# Patient Record
Sex: FEMALE | Race: WHITE
[De-identification: names, ages, dates, MRNs, and addresses within clinical notes are randomized per-mention and may not be internally consistent; named-entity substitution may affect disease eponyms.]

---

## 2017-03-04 ENCOUNTER — HOSPITAL ENCOUNTER (INPATIENT)
Dept: HOSPITAL 62 - ER | Age: 67
LOS: 2 days | Discharge: HOME | DRG: 390 | End: 2017-03-06
Attending: SURGERY | Admitting: SURGERY
Payer: MEDICARE

## 2017-03-04 DIAGNOSIS — F32.9: ICD-10-CM

## 2017-03-04 DIAGNOSIS — E03.9: ICD-10-CM

## 2017-03-04 DIAGNOSIS — K56.5: Primary | ICD-10-CM

## 2017-03-04 DIAGNOSIS — Z90.49: ICD-10-CM

## 2017-03-04 DIAGNOSIS — Z88.6: ICD-10-CM

## 2017-03-04 LAB
ALBUMIN SERPL-MCNC: 4.7 G/DL (ref 3.5–5)
ALP SERPL-CCNC: 93 U/L (ref 38–126)
ALT SERPL-CCNC: 34 U/L (ref 9–52)
ANION GAP SERPL CALC-SCNC: 14 MMOL/L (ref 5–19)
APPEARANCE UR: CLEAR
AST SERPL-CCNC: 27 U/L (ref 14–36)
BASOPHILS # BLD AUTO: 0.1 10^3/UL (ref 0–0.2)
BASOPHILS NFR BLD AUTO: 0.7 % (ref 0–2)
BILIRUB DIRECT SERPL-MCNC: 0 MG/DL (ref 0–0.3)
BILIRUB SERPL-MCNC: 0.9 MG/DL (ref 0.2–1.3)
BILIRUB UR QL STRIP: NEGATIVE
BUN SERPL-MCNC: 12 MG/DL (ref 7–20)
CALCIUM: 10.7 MG/DL (ref 8.4–10.2)
CHLORIDE SERPL-SCNC: 107 MMOL/L (ref 98–107)
CK SERPL-CCNC: 60 U/L (ref 30–135)
CO2 SERPL-SCNC: 23 MMOL/L (ref 22–30)
CREAT SERPL-MCNC: 0.61 MG/DL (ref 0.52–1.25)
EOSINOPHIL # BLD AUTO: 0.1 10^3/UL (ref 0–0.6)
EOSINOPHIL NFR BLD AUTO: 0.8 % (ref 0–6)
ERYTHROCYTE [DISTWIDTH] IN BLOOD BY AUTOMATED COUNT: 13.7 % (ref 11.5–14)
GLUCOSE SERPL-MCNC: 115 MG/DL (ref 75–110)
GLUCOSE UR STRIP-MCNC: NEGATIVE MG/DL
HCT VFR BLD CALC: 38.3 % (ref 36–47)
HGB BLD-MCNC: 12.5 G/DL (ref 12–15.5)
HGB HCT DIFFERENCE: -0.8
KETONES UR STRIP-MCNC: 20 MG/DL
LIPASE SERPL-CCNC: 67.8 U/L (ref 23–300)
LYMPHOCYTES # BLD AUTO: 0.8 10^3/UL (ref 0.5–4.7)
LYMPHOCYTES NFR BLD AUTO: 8 % (ref 13–45)
MCH RBC QN AUTO: 27.4 PG (ref 27–33.4)
MCHC RBC AUTO-ENTMCNC: 32.7 G/DL (ref 32–36)
MCV RBC AUTO: 84 FL (ref 80–97)
MONOCYTES # BLD AUTO: 0.3 10^3/UL (ref 0.1–1.4)
MONOCYTES NFR BLD AUTO: 3.1 % (ref 3–13)
NEUTROPHILS # BLD AUTO: 9.2 10^3/UL (ref 1.7–8.2)
NEUTS SEG NFR BLD AUTO: 87.4 % (ref 42–78)
NITRITE UR QL STRIP: NEGATIVE
PH UR STRIP: 7 [PH] (ref 5–9)
POTASSIUM SERPL-SCNC: 4.2 MMOL/L (ref 3.6–5)
PROT SERPL-MCNC: 7.5 G/DL (ref 6.3–8.2)
PROT UR STRIP-MCNC: NEGATIVE MG/DL
RBC # BLD AUTO: 4.58 10^6/UL (ref 3.72–5.28)
SODIUM SERPL-SCNC: 144.3 MMOL/L (ref 137–145)
SP GR UR STRIP: 1.01
UROBILINOGEN UR-MCNC: NEGATIVE MG/DL (ref ?–2)
WBC # BLD AUTO: 10.5 10^3/UL (ref 4–10.5)

## 2017-03-04 PROCEDURE — 82550 ASSAY OF CK (CPK): CPT

## 2017-03-04 PROCEDURE — 96372 THER/PROPH/DIAG INJ SC/IM: CPT

## 2017-03-04 PROCEDURE — 85025 COMPLETE CBC W/AUTO DIFF WBC: CPT

## 2017-03-04 PROCEDURE — 71010: CPT

## 2017-03-04 PROCEDURE — 74000: CPT

## 2017-03-04 PROCEDURE — 96374 THER/PROPH/DIAG INJ IV PUSH: CPT

## 2017-03-04 PROCEDURE — 93005 ELECTROCARDIOGRAM TRACING: CPT

## 2017-03-04 PROCEDURE — 81001 URINALYSIS AUTO W/SCOPE: CPT

## 2017-03-04 PROCEDURE — 93010 ELECTROCARDIOGRAM REPORT: CPT

## 2017-03-04 PROCEDURE — S0164 INJECTION PANTROPRAZOLE: HCPCS

## 2017-03-04 PROCEDURE — 36415 COLL VENOUS BLD VENIPUNCTURE: CPT

## 2017-03-04 PROCEDURE — 83605 ASSAY OF LACTIC ACID: CPT

## 2017-03-04 PROCEDURE — 0D9670Z DRAINAGE OF STOMACH WITH DRAINAGE DEVICE, VIA NATURAL OR ARTIFICIAL OPENING: ICD-10-PCS | Performed by: SURGERY

## 2017-03-04 PROCEDURE — S0028 INJECTION, FAMOTIDINE, 20 MG: HCPCS

## 2017-03-04 PROCEDURE — 80048 BASIC METABOLIC PNL TOTAL CA: CPT

## 2017-03-04 PROCEDURE — 83690 ASSAY OF LIPASE: CPT

## 2017-03-04 PROCEDURE — 80076 HEPATIC FUNCTION PANEL: CPT

## 2017-03-04 PROCEDURE — 99285 EMERGENCY DEPT VISIT HI MDM: CPT

## 2017-03-04 PROCEDURE — 74177 CT ABD & PELVIS W/CONTRAST: CPT

## 2017-03-04 PROCEDURE — 84484 ASSAY OF TROPONIN QUANT: CPT

## 2017-03-04 PROCEDURE — 96375 TX/PRO/DX INJ NEW DRUG ADDON: CPT

## 2017-03-04 PROCEDURE — 94799 UNLISTED PULMONARY SVC/PX: CPT

## 2017-03-04 PROCEDURE — 74020: CPT

## 2017-03-04 RX ADMIN — LATANOPROST SCH DROP: 50 SOLUTION OPHTHALMIC at 23:51

## 2017-03-04 RX ADMIN — DEXAMETHASONE SODIUM PHOSPHATE PRN MG: 10 INJECTION INTRAMUSCULAR; INTRAVENOUS at 20:17

## 2017-03-04 RX ADMIN — MORPHINE SULFATE PRN MG: 10 INJECTION INTRAMUSCULAR; INTRAVENOUS; SUBCUTANEOUS at 21:57

## 2017-03-04 RX ADMIN — PANTOPRAZOLE SODIUM SCH MG: 40 INJECTION, POWDER, FOR SOLUTION INTRAVENOUS at 21:59

## 2017-03-04 RX ADMIN — DEXTROSE, SODIUM CHLORIDE, AND POTASSIUM CHLORIDE PRN ML: 5; .45; .15 INJECTION INTRAVENOUS at 20:08

## 2017-03-04 RX ADMIN — MORPHINE SULFATE PRN MG: 10 INJECTION INTRAMUSCULAR; INTRAVENOUS; SUBCUTANEOUS at 23:52

## 2017-03-04 RX ADMIN — MORPHINE SULFATE PRN MG: 10 INJECTION INTRAMUSCULAR; INTRAVENOUS; SUBCUTANEOUS at 23:51

## 2017-03-04 NOTE — ER DOCUMENT REPORT
ED GI/





- General


Chief Complaint: Abdominal Pain >50


Stated Complaint: ABDOMINAL PAIN


Time seen by provider: 11:05


Notes: 


The patient is a 66-year-old female, past medical history 2 prior bowel 

obstructions, cholecystectomy, brain aneurysm repair, presents with 2 days of 

diffuse abdominal pain, worse in the epigastric area and left lower quadrant 

area, nausea and multiple episodes of non-bilious, non-feculent vomiting.  Her 

last bowel movement was 2 days ago.  She is now having muscle cramps because 

she cannot drink anything.  She denies fevers, hematemesis, diarrhea, urinary 

symptoms, chest pain, shortness of breath or flank pain.


TRAVEL OUTSIDE OF THE [place] IN LAST 30 DAYS: No





- Related Data


Allergies/Adverse Reactions: 


 





codeine [Codeine] Allergy (Verified 02/25/14 18:36)


 Nausea











Past Medical History





- General


Information source: Patient





- Social History


Smoking Status: Unknown if Ever Smoked


Family History: Reviewed & Not Pertinent


Pulmonary Medical History: 


   Denies: Hx Tuberculosis


Endocrine Medical History: Reports: Hx Hypothyroidism


Psychiatric Medical History: 


   Denies: Hx Depression


Past Surgical History: Reports: Hx Cholecystectomy.  Denies: Hx Pacemaker





- Immunizations


Hx Diphtheria, Pertussis, Tetanus Vaccination: No





Review of Systems





- Review of Systems


Notes: 


REVIEW OF SYSTEMS:


CONSTITUTIONAL: -fevers, -chills


EENT: -eye pain, -difficulty swallowing, -nasal congestion


CARDIOVASCULAR:-chest pain, -syncope.


RESPIRATORY: -cough, -SOB


GASTROINTESTINAL: +abdominal pain, +nausea, +vomiting, -diarrhea


GENITOURINARY: -dysuria, -hematuria


MUSCULOSKELETAL: -back pain, -neck pain


SKIN: -rash or skin lesions.


HEMATOLOGIC: -easy bruising or bleeding.


LYMPHATIC: -swollen, enlarged glands.


NEUROLOGICAL: -altered mental status or loss of consciousness, -headache, -

neurologic symptoms


PSYCHIATRIC: -anxiety, -depression.


ALL OTHER SYSTEMS REVIEWED AND NEGATIVE.





Physical Exam





- Vital signs


Vitals: 


 











Resp Pulse Ox


 


 18   100 


 


 03/04/17 12:02  03/04/17 12:02














- Notes


Notes: 


PHYSICAL EXAMINATION:





GENERAL: Well-nourished and in moderate acute distress.





HEAD: Atraumatic, normocephalic.





EYES: Pupils equal round and reactive to light, extraocular movements intact, 

sclera anicteric, conjunctiva are normal.





ENT: nares patent, oropharynx clear without exudates.  Moist mucous membranes.





NECK: Normal range of motion, supple without lymphadenopathy





LUNGS: Breath sounds clear to auscultation bilaterally and equal.  No wheezes 

rales or rhonchi.





HEART: Regular rate and rhythm without murmurs





ABDOMEN: Mildly distended, tenderness over epigastric region and LLL. Decreased 

bowel sounds.





EXTREMITIES: Normal range of motion, no pitting or edema.  No cyanosis.





NEUROLOGICAL: Cranial nerves grossly intact.  Normal speech, normal gait.  

Normal sensory, motor, and reflex exams.





PSYCH: Normal mood, normal affect.





SKIN: Warm, Dry, normal turgor, no rashes or lesions noted.





Course





- Re-evaluation


Re-evalutation: 


With history of multiple small bowel suction, will obtain labs and CT A/P to 

assess for obstruction.  Will also treat pain and nausea and reassess.





03/04/17 14:21 Pt with partial small bowel structure and transition in pelvis.  

Spoke to Dr. Damico (Surgicalist) and he has accepted patient.  Will place NG 

tube and admit.





- Vital Signs


Vital signs: 


 











Temp Pulse Resp BP Pulse Ox


 


       14   162/64 H  96 


 


       03/04/17 14:03  03/04/17 14:03  03/04/17 14:03














- Laboratory


Result Diagrams: 


 03/04/17 11:18





 03/04/17 11:18


Laboratory results interpreted by me: 


 











  03/04/17 03/04/17 03/04/17





  11:18 11:18 13:12


 


Seg Neutrophils %  87.4 H  


 


Lymphocytes %  8.0 L  


 


Absolute Neutrophils  9.2 H  


 


Glucose   115 H 


 


Calcium   10.7 H 


 


Urine Ketones    20 H














Discharge





- Discharge


Clinical Impression: 


 Partial small bowel obstruction





Condition: Stable


Disposition: ADMITTED AS INPATIENT


Admitting Provider: Surgicalist - Margaux


Unit Admitted: Surgical Floor

## 2017-03-04 NOTE — EKG REPORT
SEVERITY:- ABNORMAL ECG -

SINUS RHYTHM

CONSIDER LEFT VENTRICULAR HYPERTROPHY

BORDERLINE PROLONGED QT INTERVAL

:

Confirmed by: Vaibhav Camejo MD 04-Mar-2017 19:42:57

## 2017-03-04 NOTE — CONSULT/HISTORY AND PHYSICAL E
Consultation/History and Physical



PATIENT NAME: DARIA CARREON

MRN:  [id number]      : 1950     AGE: 66Y

DATE: 2017                         ROOM: 413



CHIEF COMPLAINT:

Abdominal pain.



HISTORY OF PRESENT ILLNESS:

The patient is a 66-year-old woman who has had, over the past 5 years, 3

episodes of partial small bowel obstruction.  Treated successfully with

nasogastric tube suction.  Over the last 3 days, she has had intermittent

abdominal pain and then yesterday developed vomiting and nausea and

presented to the emergency department where CT scan is consistent with a

partial versus complete small bowel obstruction.  She now states she feels

better after nasogastric tube was placed by the emergency room personnel. 

The patient states that she is still having bowel movements and had a

normal bowel movement yesterday.  She denies fever or chills or vomiting

up blood.  She denies chest pain, shortness of breath, or flank pain.



PAST MEDICAL HISTORY:

Significant for:

1.  Cerebral aneurysm treated successfully with percutaneous coil

placement.

2.  She has a history of hypothyroidism for which she takes Synthroid.

3.  She has a history of depression.



PAST SURGICAL HISTORY:

Positive for cholecystectomy.



SOCIAL HISTORY:

The patient presents with her family and does not smoke.



FAMILY HISTORY:

Otherwise noncontributory.



REVIEW OF SYSTEMS:

The patient states that she has been in very good health.  She

occasionally will have headaches.  She uses eyedrops for mild discomfort. 

She has no other complaint of head or neck problems.  She denies chest

pain, shortness of breath, change in bowel or bladder habits other than

that noted in the HPI.  She has no complaint of her skin or

musculoskeletal system, and other review of systems is otherwise negative.



ALLERGIES:

When questioned, the patient states she has an allergy to CODEINE, but her

allergy is more an intolerance because she stated it caused some nausea,

but she denies any difficulty breathing or skin changes.  I think this is

a common problem with codeine, and I doubt this is a severe allergy and

she agrees.



PHYSICAL EXAMINATION:

GENERAL:  She presents as a pleasant woman, in no acute distress. 

Nasogastric tube is in place with a small amount of output of what appears

to be contrast material.



HEAD AND NECK:  Examination grossly unremarkable for age.



CHEST:  Clear to auscultation and percussion.



CARDIAC:  Exam is without murmur, gallop, or rub.



ABDOMEN:  The abdomen is soft.  There is no significant tenderness.  There

is protuberance.  There is no evidence of peritonitis.  There is a

well-healed paramedian scar in the upper abdomen status post

cholecystectomy.  There are no other abdominal scars.



EXTREMITIES:  Peripheral examination is unremarkable regarding deep tendon

reflexes and pulses.



DIAGNOSTIC DATA:

The chest x-ray is within the normal range.  Lab base includes a white

count of 10,500, hemoglobin and hematocrit are 12.5 and 38.5, differential

is without bandemia.  Sodium 144, potassium 4.2, chloride 107, CO2 is 23,

BUN/creatinine are 12 and 0.61, glucose slightly elevated at 115.  The

total bilirubin was 0.9, ALT/AST within the normal range, lipase is within

the normal range.  Urinalysis without evidence of infection.  CT scan is

reviewed and is consistent with dilated loops of small bowel with an

apparent transition area in the pelvis.



IMPRESSION:

A 66-year-old woman with partial small bowel obstruction and a history of

recurrent small bowel obstructions, status post surgical intervention for

a cholecystectomy through a paramedian incision with no other history of

surgical abdominal interventions.



RECOMMENDATIONS:

The plan at this time is for a nasogastric tube suction, and it appears

that the patient vomited the contrast for her CT scan, and in an hour or

so, we will place water soluble contrast through her NG tube in hopes of

being able to get a KUB followup in the a.m. to verify a bowel obstructive

process or to hopefully have things open up with evidence of contrast in

her colon and decreased small bowel dilatation.  The patient and her

 understand the recommended course, and they understand that if we

documented a small bowel obstruction, then surgical intervention will be

necessary and further recommendations will follow.





DICTATING PHYSICIAN: ALBINA GASTON M.D.





[id number]                  DT: 2017    1811

PHY#: [id number]            DD: 2017    1704

ID:   [id number]           JOB#: [id number]       ACCT: [id number]



cc:SAGAR GASTON M.D.

>

## 2017-03-05 RX ADMIN — DEXTROSE, SODIUM CHLORIDE, AND POTASSIUM CHLORIDE PRN ML: 5; .45; .15 INJECTION INTRAVENOUS at 04:21

## 2017-03-05 RX ADMIN — LATANOPROST SCH DROP: 50 SOLUTION OPHTHALMIC at 21:26

## 2017-03-05 RX ADMIN — DEXAMETHASONE SODIUM PHOSPHATE PRN MG: 10 INJECTION INTRAMUSCULAR; INTRAVENOUS at 07:43

## 2017-03-05 RX ADMIN — LEVOTHYROXINE SODIUM SCH MG: 88 TABLET ORAL at 10:25

## 2017-03-05 RX ADMIN — PANTOPRAZOLE SODIUM SCH MG: 40 INJECTION, POWDER, FOR SOLUTION INTRAVENOUS at 10:25

## 2017-03-05 RX ADMIN — MORPHINE SULFATE PRN MG: 10 INJECTION INTRAMUSCULAR; INTRAVENOUS; SUBCUTANEOUS at 12:44

## 2017-03-05 RX ADMIN — DEXAMETHASONE SODIUM PHOSPHATE PRN MG: 10 INJECTION INTRAMUSCULAR; INTRAVENOUS at 00:08

## 2017-03-05 RX ADMIN — DEXAMETHASONE SODIUM PHOSPHATE PRN MG: 10 INJECTION INTRAMUSCULAR; INTRAVENOUS at 12:25

## 2017-03-05 RX ADMIN — MORPHINE SULFATE PRN MG: 10 INJECTION INTRAMUSCULAR; INTRAVENOUS; SUBCUTANEOUS at 02:28

## 2017-03-05 RX ADMIN — ENOXAPARIN SODIUM SCH MG: 40 INJECTION SUBCUTANEOUS at 07:42

## 2017-03-05 RX ADMIN — PANTOPRAZOLE SODIUM SCH MG: 40 INJECTION, POWDER, FOR SOLUTION INTRAVENOUS at 21:26

## 2017-03-05 RX ADMIN — DEXTROSE, SODIUM CHLORIDE, AND POTASSIUM CHLORIDE PRN ML: 5; .45; .15 INJECTION INTRAVENOUS at 15:28

## 2017-03-05 NOTE — PROGRESS NOTE E
Progress Note



NAME: DARIA CARREON

MRN: [id number]

: 1950             AGE: 66Y

DATE: 2017            ROOM: 413



SUBJECTIVE:

The patient is feeling better this morning.  She is afebrile with stable

vital signs.  She had no further complaints of abdominal pain, and she

states her nausea has resolved.  She also states she is having bowel

movements.



OBJECTIVE:

GENERAL:  On physical examination, she is alert and appropriate.



VITAL SIGNS:  Again, she is afebrile.  Blood pressure is 143/60.



HEAD AND NECK:  Examination remains unchanged.



CHEST:  Clear to auscultation and percussion.



CARDIOVASCULAR:  Without murmur, gallop, or rub.



ABDOMEN:  Her abdomen is soft, less protuberant.  There is no significant

tenderness.  There is no evidence of peritonitis.



EXTREMITIES:  Peripheral examination is unremarkable.



DIAGNOSTIC DATA:

Her KUB with contrast placement per NG tube reveals no evidence of

obstructive process with contrast reaching the rectum.  There is one small

area of dilated small bowel in the right upper abdomen.  However, again,

with contrast to the rectum and no evidence of further significant

abdominal distention.



PLAN:

Plan will be for discontinuation of her nasogastric tube and initiation of

a clear-liquid diet and observation.  She was encouraged to be up,

ambulating and out of bed with a plan to advance her diet as tolerated.





DICTATING PHYSICIAN:  ALBINA GASTON M.D.





[id number]                  DT: 2017    1116

PHY#: [id number]            DD: 2017    1035

ID:   [id number]           JOB#: [id number]       ACCT: [id number]



cc:

>

## 2017-03-06 VITALS — DIASTOLIC BLOOD PRESSURE: 61 MMHG | SYSTOLIC BLOOD PRESSURE: 154 MMHG

## 2017-03-06 RX ADMIN — DEXTROSE, SODIUM CHLORIDE, AND POTASSIUM CHLORIDE PRN ML: 5; .45; .15 INJECTION INTRAVENOUS at 03:30

## 2017-03-06 RX ADMIN — DEXAMETHASONE SODIUM PHOSPHATE PRN MG: 10 INJECTION INTRAMUSCULAR; INTRAVENOUS at 03:30

## 2017-03-06 RX ADMIN — LEVOTHYROXINE SODIUM SCH MG: 88 TABLET ORAL at 09:15

## 2017-03-06 RX ADMIN — PANTOPRAZOLE SODIUM SCH MG: 40 INJECTION, POWDER, FOR SOLUTION INTRAVENOUS at 09:15

## 2017-03-06 RX ADMIN — ENOXAPARIN SODIUM SCH MG: 40 INJECTION SUBCUTANEOUS at 08:32

## 2017-03-06 RX ADMIN — MORPHINE SULFATE PRN MG: 10 INJECTION INTRAMUSCULAR; INTRAVENOUS; SUBCUTANEOUS at 03:30

## 2017-03-06 RX ADMIN — DEXAMETHASONE SODIUM PHOSPHATE PRN MG: 10 INJECTION INTRAMUSCULAR; INTRAVENOUS at 09:08

## 2017-03-06 NOTE — DISCHARGE SUMMARY E
Discharge Summary



NAME: DARIA CARREON

MRN:  [id number]        : 1950     AGE: 66Y

ADMITTED: 2017                  DISCHARGED: 2017



REASON FOR ADMISSION:

Small bowel obstruction.



SUMMARY OF HOSPITALIZATION:

The patient is a 66-year-old white female with a long history of recurrent

partial small bowel obstructions felt secondary to adhesions.  She has had

over 4 in the past 10 years.  She is evaluated in the emergency department

because of abdominal pain, nausea, and vomiting.  She was found by CT scan

of the abdomen and pelvis to have multiple loops of dilated small bowel

consistent with partial small bowel obstruction versus ileus.  Surgery was

consulted and she was advised admission to the hospital for further

management.



The patient was kept on IV fluids, n.p.o., and had a nasogastric tube

inserted.  Patient had approximately 1-1/2 L of drainage out the first

day.  The following day she was feeling better and had a followup

abdominal scan which showed migration of oral contrast through the GI

tract into the colon.  Nasogastric tube was removed and the following day

she had a followup film which showed contrast in the colon.  She was

started on a clear liquid diet and this was advanced to full liquids and

tolerated reasonably well.  She had evidence of stool and flatus.  She had

no peritoneal signs.  She did have a minimally dilated small bowel loop

remaining, however, not particularly concerning.



Management options were discussed with the patient.  She expressed

interest in going home, so that was arranged.



FINAL DIAGNOSIS:

Partial small bowel obstruction, recurrent, now resolved with nonoperative

management.



DISPOSITION:

Patient will be discharged home in care of family.  Follow up with Dr. Prasad or [facility] team member on a p.r.n. basis.







DICTATING PHYSICIAN:  WILY PRASAD M.D.





[id number]                  DT: 2017    1519

PHY#: [id number]            DD: 2017    1426

ID:   [id number]           JOB#: [id number]       ACCT: [id number]



cc:SAGAR GASTON M.D., TIMOTHY M.D.

>

## 2017-03-06 NOTE — PDOC PROGRESS REPORT
Subjective


Progress Note for:: 03/06/17


Subjective:: 


Patient states she has had a bowel movement, and is relieved of nausea which 

she did have earlier this morning.





Physical Exam


Vital Signs: 


 











Temp Pulse Resp BP Pulse Ox


 


 97.6 F   72   16   134/57 H  100 


 


 03/06/17 07:27  03/06/17 07:27  03/06/17 07:27  03/06/17 07:27  03/06/17 07:27








 Intake & Output











 03/05/17 03/06/17 03/07/17





 06:59 06:59 06:59


 


Intake Total 0 3560 


 


Output Total 1600 2200 


 


Balance -1600 1360 


 


Weight 67.5 kg 66 kg 











General appearance: PRESENT: no acute distress


GI/Abdominal exam: PRESENT: other - No peritoneal signs; abdomen slightly 

distended





Results


Laboratory Results: 


 











  03/05/17





  09:50


 


Lactic Acid  0.9











Impressions: 


 





Abdomen/Pelvis CT  03/04/17 00:00


IMPRESSION:  Ileus or partial small bowel obstruction with transition in the 

pelvic region.


 








Chest X-Ray  03/04/17 11:04


IMPRESSION:  NO ACUTE RADIOGRAPHIC FINDING IN THE CHEST.


 








KUB X-Ray  03/04/17 16:10


IMPRESSION:  Nasogastric tube tip and side port in the stomach.


No small bowel air-fluid levels.


There is air in nondistended colon.


 








Abdomen X-Ray  03/05/17 08:09


IMPRESSION:  Nonobstructive bowel gas pattern with oral contrast last night CT 

throughout the colon.  Single persistent borderline dilated right upper 

quadrant small bowel loop


 














Assessment & Plan





- Diagnosis


(1) Partial small bowel obstruction


Is this a current diagnosis for this admission?: YesPlan: 


1.  Gastrointestinal tract appears to be opening up his on radiographic and 

clinical progress.





2.  Will advance to full liquid diet, and potentially send patient home this 

afternoon.

## 2018-06-04 ENCOUNTER — HOSPITAL ENCOUNTER (OUTPATIENT)
Dept: HOSPITAL 62 - SP | Age: 68
End: 2018-06-04
Attending: PHYSICAL MEDICINE & REHABILITATION
Payer: MEDICARE

## 2018-06-04 DIAGNOSIS — R06.02: ICD-10-CM

## 2018-06-04 DIAGNOSIS — R60.0: Primary | ICD-10-CM

## 2018-06-04 PROCEDURE — 93970 EXTREMITY STUDY: CPT

## 2018-06-04 NOTE — XCELERA REPORT
[address] [address]

                             Tel: [phone]

                             Fax: [phone]



                     Lower Extremity Venous Evaluation

____________________________________________________________________________



Name: DARIA CARREON

MRN: [id number]                Age: 67 yrs

Gender: Female                 : 1950

Patient Status: Outpatient     Patient Location: 

Account #: [id number]

Study Date: 2018 01:28 PM

Accession #: [id number]

____________________________________________________________________________



Procedure: Color flow and duplex imaging bilaterally of the veins of the

lower extremities as well as the Common Femoral veins.

Reason For Study: BLE SWELLING, SOB





Ordering Physician: KALA HOLMAN

Performed By: Meri Verma

____________________________________________________________________________



____________________________________________________________________________





Right Sided Venous Evaluation

Normal vessel filling wall to wall, compression and augmentation as well as

Colour flow down to the infrageniculate veins.



Left Sided Venous Evaluation

Normal vessel filling wall to wall, compression and augmentation as well as

Colour flow down to the infrageniculate veins.

____________________________________________________________________________





Interpretation Summary

No duplex evidence of DVT or obstruction in the bilateral lower

extremities.

____________________________________________________________________________



____________________________________________________________________________



Electronically signed by:      Lennox Williams      on 2018 03:45 PM



CC: KALA HOLMAN

>

Williams, Lennox